# Patient Record
Sex: FEMALE | Race: WHITE | NOT HISPANIC OR LATINO | RURAL
[De-identification: names, ages, dates, MRNs, and addresses within clinical notes are randomized per-mention and may not be internally consistent; named-entity substitution may affect disease eponyms.]

---

## 2023-12-09 ENCOUNTER — HOSPITAL ENCOUNTER (EMERGENCY)
Facility: HOSPITAL | Age: 68
Discharge: HOME | End: 2023-12-09
Payer: MEDICARE

## 2023-12-09 VITALS
RESPIRATION RATE: 17 BRPM | SYSTOLIC BLOOD PRESSURE: 111 MMHG | BODY MASS INDEX: 20.96 KG/M2 | HEART RATE: 90 BPM | OXYGEN SATURATION: 98 % | DIASTOLIC BLOOD PRESSURE: 63 MMHG | TEMPERATURE: 99 F | HEIGHT: 61 IN | WEIGHT: 111 LBS

## 2023-12-09 DIAGNOSIS — L98.9 SKIN ABNORMALITIES: Primary | ICD-10-CM

## 2023-12-09 PROCEDURE — 99283 PR EMERGENCY DEPT VISIT,LEVEL III: ICD-10-PCS | Mod: ,,, | Performed by: REGISTERED NURSE

## 2023-12-09 PROCEDURE — 99283 EMERGENCY DEPT VISIT LOW MDM: CPT | Mod: ,,, | Performed by: REGISTERED NURSE

## 2023-12-09 PROCEDURE — 99281 EMR DPT VST MAYX REQ PHY/QHP: CPT

## 2023-12-09 RX ORDER — QUETIAPINE FUMARATE 25 MG/1
TABLET, FILM COATED ORAL
COMMUNITY

## 2023-12-09 RX ORDER — PROPRANOLOL HYDROCHLORIDE 20 MG/1
20 TABLET ORAL 2 TIMES DAILY
COMMUNITY

## 2023-12-09 RX ORDER — MESALAMINE 1000 MG/1
500 SUPPOSITORY RECTAL 2 TIMES DAILY
COMMUNITY

## 2023-12-09 RX ORDER — SULFAMETHOXAZOLE AND TRIMETHOPRIM 800; 160 MG/1; MG/1
1 TABLET ORAL 2 TIMES DAILY
COMMUNITY
End: 2024-01-25

## 2023-12-10 NOTE — ED PROVIDER NOTES
Encounter Date: 12/9/2023       History     Chief Complaint   Patient presents with    GI Problem     68-year-old female presents to the emergency room tonight due to complications with the stoma.  Patient recently had surgery and has been hospital for severe  ulcerative colitis and colostomy placed.  She reports leakage from stoma for several weeks and today home health has seen her at home and noticed underneath her stoma was discoloration.        Review of patient's allergies indicates:   Allergen Reactions    Codeine Other (See Comments)    Humira [adalimumab] Other (See Comments)    Mercaptopurine analogues (thiopurines) Other (See Comments)     Liver problems    Amoxicillin Rash    Ciprofloxacin Rash    Opioids - morphine analogues Rash    Propofol Rash     Rash, shaking, Hives     History reviewed. No pertinent past medical history.  Past Surgical History:   Procedure Laterality Date    HYSTERECTOMY       History reviewed. No pertinent family history.  Social History     Tobacco Use    Smoking status: Never    Smokeless tobacco: Never   Substance Use Topics    Alcohol use: Never    Drug use: Never     Review of Systems   Respiratory:  Negative for shortness of breath.    Gastrointestinal:  Negative for nausea and vomiting.   Skin:  Positive for wound.   All other systems reviewed and are negative.      Physical Exam     Initial Vitals [12/09/23 1559]   BP Pulse Resp Temp SpO2   111/63 90 17 99.2 °F (37.3 °C) 98 %      MAP       --         Physical Exam    Constitutional: She appears well-developed and well-nourished. She is not diaphoretic.   HENT:   Head: Normocephalic and atraumatic.   Eyes: EOM are normal.   Neck: Neck supple.   Normal range of motion.  Cardiovascular:  Normal rate and intact distal pulses.           No murmur heard.  Abdominal: Bowel sounds are normal. She exhibits no distension. There is no abdominal tenderness.   Minor skin breakdown with the skin underneath colostomy   Musculoskeletal:          General: Normal range of motion.      Cervical back: Normal range of motion and neck supple.     Neurological: She is alert and oriented to person, place, and time. No cranial nerve deficit or sensory deficit. GCS score is 15. GCS eye subscore is 4. GCS verbal subscore is 5. GCS motor subscore is 6.   Skin: Skin is warm and dry.   Psychiatric: She has a normal mood and affect.         Medical Screening Exam   See Full Note    ED Course   Procedures  Labs Reviewed - No data to display       Imaging Results    None          Medications - No data to display  Medical Decision Making                                    Clinical Impression:   Final diagnoses:  [L98.9] Skin abnormalities (Primary)        ED Disposition Condition    Discharge Stable          ED Prescriptions    None       Follow-up Information    None          Pelon Fischer ACNP  12/10/23 0910

## 2024-01-17 NOTE — CONSULTS
[facility] - Emergency Department  Ostomy Care    Patient Name:  Erica Becerra   MRN:  [id number]  Date: 1/17/2024  Diagnosis: <principal problem not specified>    History:     History reviewed. No pertinent past medical history.    Social History     Socioeconomic History    Marital status:    Tobacco Use    Smoking status: Never    Smokeless tobacco: Never   Substance and Sexual Activity    Alcohol use: Never    Drug use: Never    Sexual activity: Not Currently       Precautions:     Allergies as of 12/09/2023 - Reviewed 12/09/2023   Allergen Reaction Noted    Codeine Other (See Comments) 12/09/2023    Humira [adalimumab] Other (See Comments) 12/09/2023    Mercaptopurine analogues (thiopurines) Other (See Comments) 12/09/2023    Amoxicillin Rash 12/09/2023    Ciprofloxacin Rash 12/09/2023    Opioids - morphine analogues Rash 12/09/2023    Propofol Rash 12/09/2023

## 2024-01-24 ENCOUNTER — CLINICAL SUPPORT (OUTPATIENT)
Dept: WOUND CARE | Facility: CLINIC | Age: 69
End: 2024-01-24
Payer: MEDICARE

## 2024-01-24 DIAGNOSIS — L98.491 SKIN ULCERATION, LIMITED TO BREAKDOWN OF SKIN: Primary | ICD-10-CM

## 2024-01-24 PROCEDURE — 99213 OFFICE O/P EST LOW 20 MIN: CPT | Mod: PBBFAC

## 2024-01-24 NOTE — PATIENT INSTRUCTIONS
OSTOMY POUCH APPLICATION: 1 or 2 piece cut to fit  *Change pouch every 3-7 days and if leakage  1. Utilize adhesive remover spray and gentle technique when removing old pouch.  2. Cleanse peristomal skin with warm water only  - no soap, lotions, CHG wipes, etc.   3. Dry peristomal skin well.   4. Measure stoma and cut Brava Protective Sheet - the printed paper side has adhesive.  The plain side is applied up.   5. IMeasure stoma and cut barrier. MPORTANT: The adhesive in the barrier has to be activated! Warm the barrier surface between hands for 30-60 seconds PRIOR to applying to the patients skin.  6. Ensure the skin is dry then apply the PROTECTIVE SHEET FIRST. Then barrier over the stoma and apply hand for another 30-60 seconds to activate the adhesive.  7. Ensure all edges are conformed to skin.   *Do not add paste unless ordered by ostomy nurse - paste does not act as glue. It will actually prevent the new pouch from adhering well.    NOTIFY OSTOMY or HH NURSE:  1. Pouch doesn't adhere for at least 3 days.  2. ANY leaks wound.  3. ANY peristomal skin breakdown.

## 2024-01-24 NOTE — PROGRESS NOTES
[facility] - Wound Care  Ostomy Care    Patient Name:  Erica Becerra   MRN:  [id number]  Date: 1/24/2024  Diagnosis: [unfilled]    History:     No past medical history on file.    Social History     Socioeconomic History    Marital status:    Tobacco Use    Smoking status: Never    Smokeless tobacco: Never   Substance and Sexual Activity    Alcohol use: Never    Drug use: Never    Sexual activity: Not Currently       Precautions:     Allergies as of 01/24/2024 - Reviewed 12/09/2023   Allergen Reaction Noted    Codeine Other (See Comments) 12/09/2023    Humira [adalimumab] Other (See Comments) 12/09/2023    Mercaptopurine analogues (thiopurines) Other (See Comments) 12/09/2023    Amoxicillin Rash 12/09/2023    Ciprofloxacin Rash 12/09/2023    Opioids - morphine analogues Rash 12/09/2023    Propofol Rash 12/09/2023       WOC Assessment Details/Treatment        01/24/24 1612   WOCN Assessment   WOCN Total Time (mins) 90   Visit Date 01/24/24   Visit Time 1415   Consult Type New   WOCN Speciality Ostomy   WOCN List ileostomy   Wound Pyoderma Gangrenosum   Number of Wounds 3   Continence Type Fecal   Ostomy Type Ileostomy   Intervention chart review;assessed;changed;applied;coordination of care;consult other service;orders   Teaching on-going;complication   Skin Interventions   Skin Protection hydrocolloids used;pouching devices used        Altered Skin Integrity 01/24/24 1430 Right Lower quadrant Other (comment)   Date First Assessed/Time First Assessed: 01/24/24 1430   Altered Skin Integrity Present on Admission - Did Patient arrive to the hospital with altered skin?: yes  Side: Right  Location: Lower quadrant  Primary Wound Type: (c) Other (comment)   Wound Image    Drainage Amount Small   Drainage Characteristics/Odor Creamy;Serosanguineous   Appearance Red;Maroon;Purple;Bleeding   Tissue loss description Full thickness   Red (%), Wound Tissue Color 50 %   Periwound Area Intact;Dry;Redness    Wound Edges Irregular;Undefined   Wound Length (cm) 2.5 cm   Wound Width (cm) 7.4 cm   Wound Surface Area (cm^2) 18.5 cm^2   Care Cleansed with:;Antimicrobial agent;Wound cleanser;Other (see comments)  (followed by water)   Dressing Hydrocolloid  (Brava Protective Sheet)        Ileostomy 10/27/23 Standard (Brunilda, end) RLQ   Placement Date: 10/27/23   Inserted by: MD  Ileostomy Type: Standard (Brunilda, end)  Location: RLQ   Stoma Appearance round;rosebud appearance;red;protruding above skin level   Stoma Size (in) 1 in   Appliance 2-piece convex; intact;no leakage;changed;per patient request   Accessories/Skin Care cleansed w/ water;wafer barrier over peristomal skin;skin barrier ring   Treatment Bag change   Stoma Function stool   Peristomal Assessment Inflamed;Erythema;Painful;Ulcerated   Tolerance assisted w/ stoma care;independent w/ appliance change;independent w/ stoma care     CWOCN consulted for ileostomy site assessment, assistance with difficult pouching, leakage, painful skin ulcerations    Narrative: Pt presents with daughter and .  She has an intact ostomy pouch.  Hx of ulcerative colitis resulting in ileostomy surgery late October 2023 by Braydon Rivers MD.  Has begun to have painful ulcerations that compromise her pouching system and cause leakage, sometimes daily.  Rarely gets more than three days wear time.     Goals for Patient/Caregiver Education: Ability to use products that minimize moisture under pouching system, reduce inflammation and extend wear time.      Barriers to Learning: None noted.    Recommendations made to patient and family:      Dermatology Consult - Dx & Tx Peristomal Pyoderma Gangrenosum vs other inflammatory skin lesions.      Referral to Dr. James Francis - pt and family aware    Discontinue convex pouching - will have mfg samples sent to pt's home      Brava Protective Sheet - may leave in place x 7 days unless leakage under sheet in addition to pouch leakage.        Discharge Instructions Given.     Thank you for the consult.     I will continue to follow as patient requests.    01/24/2024

## 2024-03-07 ENCOUNTER — CLINICAL SUPPORT (OUTPATIENT)
Dept: VASCULAR SURGERY | Facility: CLINIC | Age: 69
End: 2024-03-07
Payer: MEDICARE

## 2024-03-07 VITALS
OXYGEN SATURATION: 99 % | TEMPERATURE: 99 F | DIASTOLIC BLOOD PRESSURE: 70 MMHG | HEART RATE: 64 BPM | SYSTOLIC BLOOD PRESSURE: 100 MMHG

## 2024-03-07 DIAGNOSIS — T14.8XXA WOUND DISCHARGE: Primary | ICD-10-CM

## 2024-03-07 PROCEDURE — 87070 CULTURE OTHR SPECIMN AEROBIC: CPT | Performed by: NURSE PRACTITIONER

## 2024-03-07 PROCEDURE — 99212 OFFICE O/P EST SF 10 MIN: CPT | Mod: PBBFAC | Performed by: NURSE PRACTITIONER

## 2024-03-07 PROCEDURE — 99214 OFFICE O/P EST MOD 30 MIN: CPT | Mod: S$PBB,,, | Performed by: NURSE PRACTITIONER

## 2024-03-07 RX ORDER — SULFAMETHOXAZOLE AND TRIMETHOPRIM 800; 160 MG/1; MG/1
1 TABLET ORAL 2 TIMES DAILY
Qty: 20 TABLET | Refills: 0 | Status: SHIPPED | OUTPATIENT
Start: 2024-03-07

## 2024-03-07 RX ORDER — MOMETASONE FUROATE 1 MG/G
CREAM TOPICAL DAILY
Qty: 15 G | Refills: 1 | Status: SHIPPED | OUTPATIENT
Start: 2024-03-07

## 2024-03-07 NOTE — PROGRESS NOTES
Subjective:       Patient ID: Erica Becerra is a 68 y.o. female.    Chief Complaint: No chief complaint on file.  History of ulcerative colitis requiring ostomy Follow-up with ostomy nurse tpday  Ostomy nurse reports purulent drainage obtained cultures progression of skin inflammation irritation  family history is not on file.  History reviewed. No pertinent past medical history.   Past Surgical History:   Procedure Laterality Date    HYSTERECTOMY         reports that she has never smoked. She has never used smokeless tobacco. She reports that she does not drink alcohol and does not use drugs.   HPI  Review of Systems   Integumentary:  Positive for wound.         Objective:      There were no vitals taken for this visit.   Physical Exam  Vitals and nursing note reviewed.   Constitutional:       Appearance: Normal appearance.   HENT:      Head: Normocephalic.      Mouth/Throat:      Mouth: Mucous membranes are moist.   Eyes:      Conjunctiva/sclera: Conjunctivae normal.   Cardiovascular:      Rate and Rhythm: Normal rate and regular rhythm.   Pulmonary:      Effort: Pulmonary effort is normal.   Abdominal:      Palpations: Abdomen is soft.      Comments: Ostomy in place reviewed images taken by ostomy nurse with inflammatory changes around stoma  Stoma is pink healthy and functioning   Skin:     General: Skin is warm and dry.   Neurological:      Mental Status: She is alert and oriented to person, place, and time.   Psychiatric:         Mood and Affect: Mood normal.           Assessment:       1. Wound discharge        Plan:     Wound cultures  bactrim   E scribe mometasone cream   Follow up next week

## 2024-03-07 NOTE — PROGRESS NOTES
[facility] - Vascular Surgery  Ostomy Care    Patient Name:  Erica Becerra   MRN:  [id number]  Date: 3/7/2024  Diagnosis: [unfilled]    History:     History reviewed. No pertinent past medical history.    Social History     Socioeconomic History    Marital status:    Tobacco Use    Smoking status: Never    Smokeless tobacco: Never   Substance and Sexual Activity    Alcohol use: Never    Drug use: Never    Sexual activity: Not Currently       Precautions:     Allergies as of 03/07/2024 - Reviewed 03/07/2024   Allergen Reaction Noted    Codeine Other (See Comments) 12/09/2023    Humira [adalimumab] Other (See Comments) 12/09/2023    Mercaptopurine analogues (thiopurines) Other (See Comments) 12/09/2023    Amoxicillin Rash 12/09/2023    Ciprofloxacin Rash 12/09/2023    Opioids - morphine analogues Rash 12/09/2023    Propofol Rash 12/09/2023       WOC Assessment Details/Treatment     Pt to clinic, today for worsening of júnior-stomal skin ulcerations.  Has been using mometasone furoate ointment that she was previously given by primary provider. Also covered ointment and wounds with Band-Aids before placing a Mepilex dressing (As recommended by [facility] CWOCN) and then finally pouch. Purulent drainage noted upon removal of pouch. Stool leakage noted @ 5 o'clock under Band-Aid.  Reports purulence started 2 days ago and pouch wear time is approx 1 - 1 1/2 days.         Wounds cultured. Recommend mometasone cream over ointment, recommend antibiotic.  Reported to AKASH Dick, who also saw patient at this nurse's request.      Re-pouched using mometasone ointment, Mepilex Up dressing over wounds cut to fit, Brava Protective Ring over Mepilex. Convex 2 piece used.        03/07/2024

## 2024-03-10 LAB
MICROORGANISM SPEC CULT: ABNORMAL
MICROORGANISM SPEC CULT: ABNORMAL

## 2024-03-21 ENCOUNTER — OFFICE VISIT (OUTPATIENT)
Dept: VASCULAR SURGERY | Facility: CLINIC | Age: 69
End: 2024-03-21
Payer: MEDICARE

## 2024-03-21 DIAGNOSIS — B37.9 CANDIDA ALBICANS INFECTION: Primary | ICD-10-CM

## 2024-03-21 PROCEDURE — 99213 OFFICE O/P EST LOW 20 MIN: CPT | Mod: PBBFAC | Performed by: NURSE PRACTITIONER

## 2024-03-21 PROCEDURE — 99214 OFFICE O/P EST MOD 30 MIN: CPT | Mod: S$PBB,,, | Performed by: NURSE PRACTITIONER

## 2024-03-21 RX ORDER — FLUCONAZOLE 150 MG/1
150 TABLET ORAL DAILY
Qty: 1 TABLET | Refills: 1 | Status: SHIPPED | OUTPATIENT
Start: 2024-03-21 | End: 2024-03-23

## 2024-03-21 NOTE — PROGRESS NOTES
Subjective:       Patient ID: Erica Becerra is a 68 y.o. female.    Chief Complaint: Wound Check  History of ulcerative colitis requiring ostomy Follow-up with ostomy nurse tpday  Ostomy nurse reports purulent drainage obtained cultures progression of skin inflammation irritation    03/21/2024 stoma check skin excoriation improving with steroid cream now has light anti fungal rash superior stoma patient reports that is itching no signs of infection  family history is not on file.  History reviewed. No pertinent past medical history.   Past Surgical History:   Procedure Laterality Date    HYSTERECTOMY         reports that she has never smoked. She has never used smokeless tobacco. She reports that she does not drink alcohol and does not use drugs.   Wound Check      Review of Systems   Integumentary:  Positive for rash and wound.        Client complain of itching around stone         Objective:      There were no vitals taken for this visit.   Physical Exam  Vitals and nursing note reviewed.   Constitutional:       Appearance: Normal appearance.   HENT:      Head: Normocephalic.      Mouth/Throat:      Mouth: Mucous membranes are moist.   Eyes:      Conjunctiva/sclera: Conjunctivae normal.   Cardiovascular:      Rate and Rhythm: Normal rate and regular rhythm.   Pulmonary:      Effort: Pulmonary effort is normal.   Abdominal:      General: Abdomen is flat.      Palpations: Abdomen is soft.      Comments: Stoma pink healthy functioning  Noninfected rash 12:00 p.m. 3 o'clock position around stoma consistent with fungal infection  No purulence no cellulitis     Skin:     General: Skin is warm and dry.   Neurological:      Mental Status: She is alert and oriented to person, place, and time.   Psychiatric:         Mood and Affect: Mood normal.           Assessment:       1. Candida albicans infection        Plan:       E seanibmarisol [facility] pharmacy patient will begin using her home antifungal powder continue steroid  cream ET nurse repack pouching stoma today

## 2024-04-11 ENCOUNTER — CLINICAL SUPPORT (OUTPATIENT)
Dept: VASCULAR SURGERY | Facility: CLINIC | Age: 69
End: 2024-04-11
Payer: MEDICARE

## 2024-04-11 DIAGNOSIS — T14.8XXA WOUND DISCHARGE: Primary | ICD-10-CM

## 2024-04-11 PROCEDURE — 99212 OFFICE O/P EST SF 10 MIN: CPT | Mod: PBBFAC | Performed by: NURSE PRACTITIONER

## 2024-04-12 NOTE — PROGRESS NOTES
[facility] - Vascular Surgery  Ostomy Care    Patient Name:  Erica Becerra   MRN:  [id number]  Date: 4/11/2024  Diagnosis: Peristomal Ulcerations    History:     No past medical history on file.    Social History     Socioeconomic History    Marital status:    Tobacco Use    Smoking status: Never    Smokeless tobacco: Never   Substance and Sexual Activity    Alcohol use: Never    Drug use: Never    Sexual activity: Not Currently       Precautions:     Allergies as of 04/11/2024 - Reviewed 04/11/2024   Allergen Reaction Noted    Codeine Other (See Comments) 12/09/2023    Humira [adalimumab] Other (See Comments) 12/09/2023    Mercaptopurine analogues (thiopurines) Other (See Comments) 12/09/2023    Amoxicillin Rash 12/09/2023    Ciprofloxacin Rash 12/09/2023    Opioids - morphine analogues Rash 12/09/2023    Propofol Rash 12/09/2023       WOC Assessment Details/Treatment        04/11/24 1949   WOCN Assessment   WOCN Total Time (mins) 75   Visit Date 04/11/24   Visit Time 1330   Consult Type Follow Up   WOCN Speciality Wound;Ostomy   WOCN List ileostomy   Wound Pyoderma Gangrenosum   Number of Wounds 1   Continence Type Fecal   Ostomy Type Ileostomy   Procedure ostomy pouch   Intervention chart review;assessed;changed;applied   Teaching on-going;complication   Skin Interventions   Device Skin Pressure Protection absorbent pad utilized/changed   Skin Protection adhesive use limited;hydrocolloids used;pouching devices used;incontinence pads utilized        Altered Skin Integrity 01/24/24 1430 Right medial Lower quadrant Other (comment)   Date First Assessed/Time First Assessed: 01/24/24 1430   Altered Skin Integrity Present on Admission - Did Patient arrive to the hospital with altered skin?: yes  Side: Right  Orientation: medial  Location: Lower quadrant  Primary Wound Type: (c) Othe...   Wound Image    Dressing Appearance Intact;Moist drainage   Drainage Amount Small   Drainage Characteristics/Odor  Serosanguineous;No odor   Appearance Red;Moist;Granulating   Tissue loss description Partial thickness   Red (%), Wound Tissue Color 100 %   Periwound Area Intact;Dry;Redness;Scar tissue   Wound Edges Defined;Irregular;Open   Wound Length (cm) 0.5 cm   Wound Width (cm) 2.2 cm   Wound Depth (cm) 0.1 cm   Wound Volume (cm^3) 0.11 cm^3   Wound Surface Area (cm^2) 1.1 cm^2   Care Cleansed with:;Other (see comments);Applied:  (water; mometasone cream)   Dressing Changed;Hydrocolloid   Periwound Care Dry periwound area maintained;Hydrocolloid barrier applied        Ileostomy 10/27/23 Standard (suma Worley) RLQ   Placement Date: 10/27/23   Present Prior to Hospital Arrival?: Yes  Inserted by: MD  Ileostomy Type: Standard (suma Worley)  Location: RLQ  Initial Stoma Size (in): 1.13 in   Stoma Appearance round;rosebud appearance;mottled   Stoma Size (in) 1 1/8   Appliance 2-piece;no leakage;per patient request   Accessories/Skin Care cleansed w/ water;corticosteroid cream;wafer barrier over peristomal skin   Treatment Bag change;Site care   Stoma Function stool;mushy   Peristomal Assessment Clean;Erythema;Induration;Inflamed;Irregular border;Painful;Ulcerated   Tolerance signs/symptoms of discomfort;assisted w/ appliance change;assisted w/ stoma care     CWOCN consulted for peristomal ulcerations - likely PG    Narrative: Pt presents today for f/u peristomal assessment.  Reports that she achieved 4 day wear time since last visit and was pleased to report that she was able to go shopping for the first time in a long time. However, also reports that she was in a hurry during last pouch change on Monday and didn't use adhesive release during pouch removal and feels some irritation.  Instructed on healing process of wounds post resurfacing and tensile strength of 80% when fully remodeled, inflammatory process of ulcerations and predisposition of area for further breakdown. Verbalized understanding.     Goals for  Patient/Caregiver: Pt will achieve 4+ day wear-time, will minimize future trauma with use of appropriate products at every pouch change.       Patient and/or caregiver will successfully perform return demonstration of skill necessary for the care and maintenance of a Ileostomy - ongoing    Barriers to Learning: None noted    Thank you for the consult.     I will continue to follow per patient request - plan 1 week f/u.     04/11/2024

## 2024-05-06 ENCOUNTER — DOCUMENT SCAN (OUTPATIENT)
Dept: HOME HEALTH SERVICES | Facility: HOSPITAL | Age: 69
End: 2024-05-06
Payer: MEDICARE

## 2024-05-14 DIAGNOSIS — M75.121 COMPLETE ROTATR-CUFF TEAR/RUPTR OF R SHOULDER, NOT TRAUMA: Primary | ICD-10-CM

## 2024-05-22 ENCOUNTER — HOSPITAL ENCOUNTER (OUTPATIENT)
Dept: RADIOLOGY | Facility: HOSPITAL | Age: 69
Discharge: HOME | End: 2024-05-22
Attending: ORTHOPAEDIC SURGERY
Payer: MEDICARE

## 2024-05-22 ENCOUNTER — OFFICE VISIT (OUTPATIENT)
Dept: ORTHOPEDICS | Facility: CLINIC | Age: 69
End: 2024-05-22
Payer: MEDICARE

## 2024-05-22 DIAGNOSIS — M75.121 NONTRAUMATIC COMPLETE TEAR OF RIGHT ROTATOR CUFF: Primary | ICD-10-CM

## 2024-05-22 DIAGNOSIS — Z01.812 PRE-OPERATIVE LABORATORY EXAMINATION: ICD-10-CM

## 2024-05-22 DIAGNOSIS — M75.121 COMPLETE ROTATR-CUFF TEAR/RUPTR OF R SHOULDER, NOT TRAUMA: ICD-10-CM

## 2024-05-22 DIAGNOSIS — Z01.810 PRE-OPERATIVE CARDIOVASCULAR EXAMINATION: ICD-10-CM

## 2024-05-22 DIAGNOSIS — M25.511 RIGHT SHOULDER PAIN, UNSPECIFIED CHRONICITY: ICD-10-CM

## 2024-05-22 DIAGNOSIS — M25.511 RIGHT SHOULDER PAIN, UNSPECIFIED CHRONICITY: Primary | ICD-10-CM

## 2024-05-22 PROCEDURE — 99213 OFFICE O/P EST LOW 20 MIN: CPT | Mod: PBBFAC,25 | Performed by: ORTHOPAEDIC SURGERY

## 2024-05-22 PROCEDURE — 73030 X-RAY EXAM OF SHOULDER: CPT | Mod: TC,RT

## 2024-05-22 PROCEDURE — 99204 OFFICE O/P NEW MOD 45 MIN: CPT | Mod: S$PBB,,, | Performed by: ORTHOPAEDIC SURGERY

## 2024-05-22 PROCEDURE — 73030 X-RAY EXAM OF SHOULDER: CPT | Mod: 26,RT,, | Performed by: ORTHOPAEDIC SURGERY

## 2024-05-22 NOTE — PROGRESS NOTES
Radiology Interpretation        Patient Name: Erica Becerra  Date: 5/22/2024  YOB: 1955  MRN# [id number]        ORDERING DIAGNOSIS:    Encounter Diagnosis   Name Primary?    Complete rotatr-cuff tear/ruptr of r shoulder, not trauma         Two views AP lateral right shoulder skeletally mature individual there is elevation of the humeral head there is some degenerative changes of the AC joint as well as mild degenerative changes glenohumeral joint no fractures noted impression degenerative changes AC joint right shoulder               Jonathan Cummings MD

## 2024-05-22 NOTE — PATIENT INSTRUCTIONS
Your surgery is scheduled at [facility] in [place] for 2024    Pre-Op Testin2024    ___x____ Lab (Clinic Lab 1st Floor)  _______ Chest X-ray ([facility] 1st Floor)  ___x____ EKG (Clinic 2nd Floor)    *Our office will contact you the day before surgery to give you  the arrival time.    *Do NOT eat or drink anything after midnight the night before your surgery.    *Bring all medications in their original bottles.    *Bring anything you may need for an overnight stay.     *Bathe with Hibiclens the night or morning before surgery.    *The morning of your surgery ONLY take blood pressure medications, heart medications, medications for acid reflux, and thyroid medications (the morning dose only).  *Take these medications with a sip of water.    *Do not take Insulin or Diabetic Medications the night before or the morning of your surgery, unless directed otherwise.    *Be sure that you have stopped blood thinners at the appropriate time, as instructed.  (_____ days prior to surgery)    *Bring your C-Pap machine if you have one.    *All jewelry and piercings MUST be removed prior to surgery.    *False eye lashes must be removed prior to surgery.    *Any questions regarding co-pays or deductibles with insurance, please contact the [facility] at #[phone].    *For Financial Assistance you may call #[phone] or #[phone].    Thank you for choosing Dr. Jonathan Cummings for your Orthopedic needs.  We look forward to caring for you. If you have any questions, please contact our office at [phone].

## 2024-05-22 NOTE — PROGRESS NOTES
Clinic Note        CC: No chief complaint on file.       Principal problem: Nontraumatic complete tear of right rotator cuff [M75.121]     REASON FOR VISIT:       HISTORY:  68-year-old female who is complaining of right shoulder pain she had surgery and after her surgery she noticed that she felt a pop in her arm and since that time been unable to bring it up overhead it has been a proximally 5 months.  She denies any numbness or tingling.      PAST MEDICAL HISTORY: History reviewed. No pertinent past medical history.       PAST SURGICAL HISTORY:   Past Surgical History:   Procedure Laterality Date    HYSTERECTOMY            ALLERGIES:   Review of patient's allergies indicates:   Allergen Reactions    Codeine Other (See Comments)    Humira [adalimumab] Other (See Comments)    Mercaptopurine analogues (thiopurines) Other (See Comments)     Liver problems    Amoxicillin Rash    Ciprofloxacin Rash    Opioids - morphine analogues Rash    Propofol Rash     Rash, shaking, Hives        MEDICATIONS :    Current Outpatient Medications:     mesalamine (CANASA) 1000 MG Supp, Place 500 mg rectally 2 (two) times daily., Disp: , Rfl:     mometasone 0.1% (ELOCON) 0.1 % cream, Apply topically once daily., Disp: 15 g, Rfl: 1    propranoloL (INDERAL) 20 MG tablet, Take 20 mg by mouth 2 (two) times daily., Disp: , Rfl:     QUEtiapine (SEROQUEL) 25 MG Tab, Take by mouth., Disp: , Rfl:     sulfamethoxazole-trimethoprim 800-160mg (BACTRIM DS) 800-160 mg Tab, Take 1 tablet by mouth 2 (two) times daily., Disp: 20 tablet, Rfl: 0     SOCIAL HISTORY:   Social History     Socioeconomic History    Marital status:    Tobacco Use    Smoking status: Never    Smokeless tobacco: Never   Substance and Sexual Activity    Alcohol use: Never    Drug use: Never    Sexual activity: Not Currently          FAMILY HISTORY: No family history on file.       PHYSICAL EXAM:  In general, this is a well-developed, well-nourished female . The  patient is alert, oriented and cooperative.      HEENT:  Normocephalic, atraumatic.  Extraocular movements are intact bilaterally.  The oropharynx is benign.       NECK:  Nontender with good range of motion.      LUNGS:  Clear to auscultation bilaterally.      HEART:  Demonstrates a regular rate and rhythm.  No murmurs appreciated.      ABDOMEN:  Soft, non-tender, non-distended.        EXTREMITIES:  Right upper extremity she moves her fingers has sensation to touch has palpable pulses distally she does have pain on palpation over the anterolateral acromion.  She has passive motion that is near full and equal with the opposite side.  Actively she can not forward flex or abduct past proximally 80°.  No instability noted.      RADIOGRAPHIC FINDINGS:  X-rays show some slight elevation of the humeral head on the right with degenerative changes AC joint MRI of the right shoulder shows complete supraspinatus and infraspinatus tear      IMPRESSION: Nontraumatic complete tear of right rotator cuff    Complete rotatr-cuff tear/ruptr of r shoulder, not trauma  -     Ambulatory referral/consult to Orthopedics         PLAN:  Patient has large rotator cuff tear.  At this time it is several months old.  We discussed treatment options.  We will set her up for rotator cuff repair of the right shoulder.  We will set this up in the near future.  Risks and benefits of arthroscopy and possible open repair were discussed patient wished to proceed with surgery.  There are no Patient Instructions on file for this visit.      No follow-ups on file.         Jonathan Cummings      (Subject to voice recognition error, transcription service not allowed)

## 2024-05-30 ENCOUNTER — CLINICAL SUPPORT (OUTPATIENT)
Dept: VASCULAR SURGERY | Facility: CLINIC | Age: 69
End: 2024-05-30
Payer: MEDICARE

## 2024-05-30 DIAGNOSIS — Z43.2 ILEOSTOMY CARE: Primary | ICD-10-CM

## 2024-05-30 PROCEDURE — 99214 OFFICE O/P EST MOD 30 MIN: CPT | Mod: S$PBB,,, | Performed by: NURSE PRACTITIONER

## 2024-05-30 PROCEDURE — 99212 OFFICE O/P EST SF 10 MIN: CPT | Mod: PBBFAC | Performed by: NURSE PRACTITIONER

## 2024-05-30 RX ORDER — PREDNISONE 10 MG/1
TABLET ORAL
Qty: 21 TABLET | Refills: 0 | Status: SHIPPED | OUTPATIENT
Start: 2024-05-30 | End: 2024-06-13

## 2024-05-30 NOTE — ADDENDUM NOTE
Addended by: WINTER COCHRAN on: 5/30/2024 11:42 AM     Modules accepted: Orders, Level of Service

## 2024-05-30 NOTE — PROGRESS NOTES
[facility] - Vascular Surgery  Ostomy Care    Patient Name:  Erica Becerra   MRN:  [id number]  Date: 5/30/2024  Diagnosis: [unfilled]    History:     History reviewed. No pertinent past medical history.    Social History     Socioeconomic History    Marital status:    Tobacco Use    Smoking status: Never    Smokeless tobacco: Never   Substance and Sexual Activity    Alcohol use: Never    Drug use: Never    Sexual activity: Not Currently       Precautions:     Allergies as of 05/30/2024 - Reviewed 05/30/2024   Allergen Reaction Noted    Codeine Other (See Comments) 12/09/2023    Humira [adalimumab] Other (See Comments) 12/09/2023    Mercaptopurine analogues (thiopurines) Other (See Comments) 12/09/2023    Amoxicillin Rash 12/09/2023    Ciprofloxacin Rash 12/09/2023    Opioids - morphine analogues Rash 12/09/2023    Propofol Rash 12/09/2023       WOC Assessment Details/Treatment        05/30/24 1434   WOCN Assessment   WOCN Total Time (mins) 90   Visit Date 05/30/24   Visit Time 1330   Consult Type Follow Up   WOCN Speciality Wound;Ostomy   WOCN List ileostomy   Wound Pyoderma Gangrenosum;surgical   Number of Wounds 2   Continence Type Fecal   Ostomy Type Ileostomy   Procedure ostomy pouch;silver nitrate   Intervention chart review;assessed;changed;applied;team conference   Skin Interventions   Skin Protection pouching devices used   Positioning   Body Position supine   Head of Bed (HOB) Positioning HOB at 20-30 degrees   Pressure Injury Prevention    Check Moisture Management Pad Done        Altered Skin Integrity 01/24/24 1430 Right medial;lateral Lower quadrant Other (comment)   Date First Assessed/Time First Assessed: 01/24/24 1430   Altered Skin Integrity Present on Admission - Did Patient arrive to the hospital with altered skin?: yes  Side: Right  Orientation: medial;lateral  Location: Lower quadrant  Is this injury devic...   Wound Image     Dressing Appearance Intact;Moist drainage    Drainage Amount Small   Drainage Characteristics/Odor Serosanguineous   Appearance Red;Moist;Granulating;Bleeding   Tissue loss description Full thickness   Red (%), Wound Tissue Color 100 %   Periwound Area Gray;Maroon;Redness;Scar tissue   Wound Edges Defined;Open   Wound Length (cm) 0.2 cm   Wound Width (cm) 0.8 cm   Wound Depth (cm) 0.1 cm   Wound Volume (cm^3) 0.016 cm^3   Wound Surface Area (cm^2) 0.16 cm^2   Care Applied:;Other (see comments)  (Tacrolimus 1% topical)   Dressing Non-adherent;Other (comment);Hydrocolloid  (Curad Pad)        Ileostomy 10/27/23 Standard (Brunilda, end) RLQ   Placement Date: 10/27/23   Present Prior to Hospital Arrival?: Yes  Inserted by: MD  Ileostomy Type: Standard (Brunilda, end)  Location: RLQ  Initial Stoma Size (in): 1.13 in  Additional Comments: Surgeon Dr. Silvestre MD   Wound Image     Stoma Appearance round;rosebud appearance;dusky;moist;protruding above skin level;other (see comments)  (granulomas)   Stoma Size (in) 29   Appliance 2-piece;intact;no leakage;changed;per patient request   Accessories/Skin Care appliance belt;skin barrier ring   Treatment Bag change;Site care   Stoma Function flatus;stool;mushy;brown   Peristomal Assessment Inflamed;Moist;Red;Ulcerated;Other (Comment)  (granulomas 6 o'clock)   Tolerance no signs/symptoms of discomfort;assisted w/ appliance change     CWOCN follow-up visit for peristomal ulcerations - PG    Narrative: Pt presents with family for continued treatment/assessment of 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 See additional note under Notes TAB for tentative f/u plan/dates.    05/30/2024

## 2024-05-30 NOTE — PROGRESS NOTES
Subjective:       Patient ID: Erica Becerra is a 68 y.o. female.    Chief Complaint: No chief complaint on file.  History of ulcerative colitis requiring ostomy Follow-up with ostomy nurse tpday  Ostomy nurse reports purulent drainage obtained cultures progression of skin inflammation irritation    03/21/2024 stoma check skin excoriation improving with steroid cream now has light anti fungal rash superior stoma patient reports that is itching no signs of infection    05/30/2024  wound check ileostomy with some leakage  stoma healthy, functioning,6:00 position small granuloma treated with silver nitrate, new areas of ulceration see images in epic                  family history is not on file.  History reviewed. No pertinent past medical history.   Past Surgical History:   Procedure Laterality Date    HYSTERECTOMY         reports that she has never smoked. She has never used smokeless tobacco. She reports that she does not drink alcohol and does not use drugs.   HPI  Review of Systems   Integumentary:  Positive for wound.         Objective:      There were no vitals taken for this visit.   Physical Exam  Vitals and nursing note reviewed.   Constitutional:       Appearance: Normal appearance.   HENT:      Head: Normocephalic.      Mouth/Throat:      Mouth: Mucous membranes are moist.   Eyes:      Conjunctiva/sclera: Conjunctivae normal.   Cardiovascular:      Rate and Rhythm: Normal rate and regular rhythm.   Pulmonary:      Effort: Pulmonary effort is normal.   Abdominal:      Palpations: Abdomen is soft.      Comments: ileostomy   Skin:     General: Skin is warm and dry.   Neurological:      Mental Status: She is alert and oriented to person, place, and time.   Psychiatric:         Mood and Affect: Mood normal.           Assessment:       1. Ileostomy care        Plan:       Low dose round of prednisone 20mg for 7 days then taper to 10 mg daily   Has appointment in September with dermatology, Ashley Dawson

## 2024-07-02 ENCOUNTER — CLINICAL SUPPORT (OUTPATIENT)
Dept: CARDIOLOGY | Facility: CLINIC | Age: 69
End: 2024-07-02
Payer: MEDICARE

## 2024-07-02 DIAGNOSIS — Z01.810 PRE-OPERATIVE CARDIOVASCULAR EXAMINATION: ICD-10-CM

## 2024-07-02 PROCEDURE — 99211 OFF/OP EST MAY X REQ PHY/QHP: CPT | Mod: PBBFAC,25

## 2024-07-02 PROCEDURE — 93010 ELECTROCARDIOGRAM REPORT: CPT | Mod: S$PBB,,, | Performed by: INTERNAL MEDICINE

## 2024-07-02 PROCEDURE — 99999 PR PBB SHADOW E&M-EST. PATIENT-LVL I: CPT | Mod: PBBFAC,,,

## 2024-07-02 PROCEDURE — 93005 ELECTROCARDIOGRAM TRACING: CPT | Mod: PBBFAC | Performed by: INTERNAL MEDICINE

## 2024-07-03 LAB
OHS QRS DURATION: 76 MS
OHS QTC CALCULATION: 418 MS

## 2024-07-15 NOTE — HPI
68-year-old female with a right shoulder rotator cuff tear with some AC arthritis needing arthroscopic evaluation debridement possible repair of the right shoulder possible open repair  Right upper extremity she moves her fingers has sensation to touch has palpable pulses tender to palpation over her anterolateral acromion as well as over AC joint.  Weakness on forward flexion abduction.  Pain on impingement testing pain on crossed adduction testing.  X-rays show degenerative changes AC joint right shoulder MRI shows rotator cuff tear with AC arthritis right shoulder  Impression rotator cuff tear and AC arthritis right shoulder  Plan arthroscopic evaluation debridement possible repair right shoulder possible open repair

## 2024-07-15 NOTE — SUBJECTIVE & OBJECTIVE
No past medical history on file.    Past Surgical History:   Procedure Laterality Date    HYSTERECTOMY      ILEOSTOMY Right        Review of patient's allergies indicates:   Allergen Reactions    Codeine Other (See Comments)    Humira [adalimumab] Other (See Comments)    Mercaptopurine analogues (thiopurines) Other (See Comments)     Liver problems    Amoxicillin Rash    Ciprofloxacin Rash    Opioids - morphine analogues Rash    Propofol Rash     Rash, shaking, Hives       No current facility-administered medications for this encounter.     Current Outpatient Medications   Medication Sig    mesalamine (CANASA) 1000 MG Supp Place 500 mg rectally 2 (two) times daily.    mometasone 0.1% (ELOCON) 0.1 % cream Apply topically once daily.    propranoloL (INDERAL) 20 MG tablet Take 20 mg by mouth 2 (two) times daily.    QUEtiapine (SEROQUEL) 25 MG Tab Take by mouth.    sulfamethoxazole-trimethoprim 800-160mg (BACTRIM DS) 800-160 mg Tab Take 1 tablet by mouth 2 (two) times daily.     Family History    None       Tobacco Use    Smoking status: Never    Smokeless tobacco: Never   Substance and Sexual Activity    Alcohol use: Never    Drug use: Never    Sexual activity: Not Currently     Review of Systems   Constitutional: Negative for decreased appetite.   HENT:  Negative for congestion and ear discharge.    Eyes:  Negative for blurred vision.   Cardiovascular:  Negative for chest pain and syncope.   Respiratory:  Negative for cough and wheezing.    Endocrine: Negative for cold intolerance and polyuria.   Hematologic/Lymphatic: Negative for adenopathy and bleeding problem.   Skin:  Negative for color change, nail changes and suspicious lesions.   Musculoskeletal:  Positive for joint pain. Negative for muscle cramps and myalgias.   Gastrointestinal:  Negative for bloating and abdominal pain.   Genitourinary:  Negative for frequency and hematuria.   Neurological:  Negative for brief paralysis, sensory change and weakness.    Psychiatric/Behavioral:  Negative for altered mental status.    Allergic/Immunologic: Negative for hives.     Objective:     Vital Signs (Most Recent):    Vital Signs (24h Range):  BP: ()/()   Arterial Line BP: ()/()            There is no height or weight on file to calculate BMI.    No intake or output data in the 24 hours ending 07/15/24 1345             Right Shoulder Exam     Tenderness   The patient is tender to palpation of the acromioclavicular joint and supraspinatus.    Other   Sensation: normal    Vascular Exam     Right Pulses      Radial:                    2+         Significant Labs: All pertinent labs within the past 24 hours have been reviewed.    Significant Imaging: I have reviewed all pertinent imaging results/findings.

## 2024-07-15 NOTE — H&P
[facility] - Orthopedic Periop Services  Orthopedics  H&P    Patient Name: Erica Becerra  MRN: [id number]  Admission Date: (Not on file)  Primary Care Provider: No, Primary Doctor    Patient information was obtained from patient and past medical records.     Subjective:     Principal Problem:<principal problem not specified>    Chief Complaint: No chief complaint on file.       HPI: 68-year-old female with a right shoulder rotator cuff tear with some AC arthritis needing arthroscopic evaluation debridement possible repair of the right shoulder possible open repair  Right upper extremity she moves her fingers has sensation to touch has palpable pulses tender to palpation over her anterolateral acromion as well as over AC joint.  Weakness on forward flexion abduction.  Pain on impingement testing pain on crossed adduction testing.  X-rays show degenerative changes AC joint right shoulder MRI shows rotator cuff tear with AC arthritis right shoulder  Impression rotator cuff tear and AC arthritis right shoulder  Plan arthroscopic evaluation debridement possible repair right shoulder possible open repair    No past medical history on file.    Past Surgical History:   Procedure Laterality Date    HYSTERECTOMY      ILEOSTOMY Right        Review of patient's allergies indicates:   Allergen Reactions    Codeine Other (See Comments)    Humira [adalimumab] Other (See Comments)    Mercaptopurine analogues (thiopurines) Other (See Comments)     Liver problems    Amoxicillin Rash    Ciprofloxacin Rash    Opioids - morphine analogues Rash    Propofol Rash     Rash, shaking, Hives       No current facility-administered medications for this encounter.     Current Outpatient Medications   Medication Sig    mesalamine (CANASA) 1000 MG Supp Place 500 mg rectally 2 (two) times daily.    mometasone 0.1% (ELOCON) 0.1 % cream Apply topically once daily.    propranoloL (INDERAL) 20 MG tablet Take 20 mg by mouth 2 (two) times daily.     QUEtiapine (SEROQUEL) 25 MG Tab Take by mouth.    sulfamethoxazole-trimethoprim 800-160mg (BACTRIM DS) 800-160 mg Tab Take 1 tablet by mouth 2 (two) times daily.     Family History    None       Tobacco Use    Smoking status: Never    Smokeless tobacco: Never   Substance and Sexual Activity    Alcohol use: Never    Drug use: Never    Sexual activity: Not Currently     Review of Systems   Constitutional: Negative for decreased appetite.   HENT:  Negative for congestion and ear discharge.    Eyes:  Negative for blurred vision.   Cardiovascular:  Negative for chest pain and syncope.   Respiratory:  Negative for cough and wheezing.    Endocrine: Negative for cold intolerance and polyuria.   Hematologic/Lymphatic: Negative for adenopathy and bleeding problem.   Skin:  Negative for color change, nail changes and suspicious lesions.   Musculoskeletal:  Positive for joint pain. Negative for muscle cramps and myalgias.   Gastrointestinal:  Negative for bloating and abdominal pain.   Genitourinary:  Negative for frequency and hematuria.   Neurological:  Negative for brief paralysis, sensory change and weakness.   Psychiatric/Behavioral:  Negative for altered mental status.    Allergic/Immunologic: Negative for hives.     Objective:     Vital Signs (Most Recent):    Vital Signs (24h Range):  BP: ()/()   Arterial Line BP: ()/()            There is no height or weight on file to calculate BMI.    No intake or output data in the 24 hours ending 07/15/24 1345             Right Shoulder Exam     Tenderness   The patient is tender to palpation of the acromioclavicular joint and supraspinatus.    Other   Sensation: normal    Vascular Exam     Right Pulses      Radial:                    2+         Significant Labs: All pertinent labs within the past 24 hours have been reviewed.    Significant Imaging: I have reviewed all pertinent imaging results/findings.  Assessment/Plan:     No notes have been filed under this hospital  service.  Service: Orthopedic Surgery      Jonathan Cummings MD  Orthopedics  [facility] - Orthopedic Periop Services

## 2024-07-16 ENCOUNTER — ANESTHESIA EVENT (OUTPATIENT)
Dept: SURGERY | Facility: HOSPITAL | Age: 69
End: 2024-07-16
Payer: MEDICARE

## 2024-07-16 ENCOUNTER — HOSPITAL ENCOUNTER (OUTPATIENT)
Facility: HOSPITAL | Age: 69
Discharge: HOME | End: 2024-07-16
Attending: ORTHOPAEDIC SURGERY | Admitting: ORTHOPAEDIC SURGERY
Payer: MEDICARE

## 2024-07-16 ENCOUNTER — ANESTHESIA (OUTPATIENT)
Dept: SURGERY | Facility: HOSPITAL | Age: 69
End: 2024-07-16
Payer: MEDICARE

## 2024-07-16 VITALS
HEART RATE: 73 BPM | SYSTOLIC BLOOD PRESSURE: 114 MMHG | TEMPERATURE: 98 F | RESPIRATION RATE: 16 BRPM | OXYGEN SATURATION: 98 % | WEIGHT: 110 LBS | BODY MASS INDEX: 20.78 KG/M2 | DIASTOLIC BLOOD PRESSURE: 57 MMHG

## 2024-07-16 DIAGNOSIS — M75.121 NONTRAUMATIC COMPLETE TEAR OF RIGHT ROTATOR CUFF: ICD-10-CM

## 2024-07-16 PROCEDURE — 25000003 PHARM REV CODE 250: Performed by: ORTHOPAEDIC SURGERY

## 2024-07-16 PROCEDURE — D9220A PRA ANESTHESIA: Mod: ANES,,, | Performed by: ANESTHESIOLOGY

## 2024-07-16 PROCEDURE — D9220A PRA ANESTHESIA: Mod: CRNA,,,

## 2024-07-16 PROCEDURE — 27000450 HC CEREBRAL OXIMETER PROBE: Performed by: ANESTHESIOLOGY

## 2024-07-16 PROCEDURE — 71000015 HC POSTOP RECOV 1ST HR: Performed by: ORTHOPAEDIC SURGERY

## 2024-07-16 PROCEDURE — 25000003 PHARM REV CODE 250

## 2024-07-16 PROCEDURE — 37000008 HC ANESTHESIA 1ST 15 MINUTES: Performed by: ORTHOPAEDIC SURGERY

## 2024-07-16 PROCEDURE — 27200750 HC INSULATED NEEDLE/ STIMUPLEX: Performed by: ANESTHESIOLOGY

## 2024-07-16 PROCEDURE — 63600175 PHARM REV CODE 636 W HCPCS: Mod: JZ,JG | Performed by: ORTHOPAEDIC SURGERY

## 2024-07-16 PROCEDURE — 27000510 HC BLANKET BAIR HUGGER ANY SIZE: Performed by: ANESTHESIOLOGY

## 2024-07-16 PROCEDURE — 27202344 HC EYESHIELD: Performed by: ANESTHESIOLOGY

## 2024-07-16 PROCEDURE — 27000655: Performed by: ANESTHESIOLOGY

## 2024-07-16 PROCEDURE — 71000033 HC RECOVERY, INTIAL HOUR: Performed by: ORTHOPAEDIC SURGERY

## 2024-07-16 PROCEDURE — 27000689 HC BLADE LARYNGOSCOPE ANY SIZE: Performed by: ANESTHESIOLOGY

## 2024-07-16 PROCEDURE — 36000710: Performed by: ORTHOPAEDIC SURGERY

## 2024-07-16 PROCEDURE — 97161 PT EVAL LOW COMPLEX 20 MIN: CPT

## 2024-07-16 PROCEDURE — 37000009 HC ANESTHESIA EA ADD 15 MINS: Performed by: ORTHOPAEDIC SURGERY

## 2024-07-16 PROCEDURE — 27201423 OPTIME MED/SURG SUP & DEVICES STERILE SUPPLY: Performed by: ORTHOPAEDIC SURGERY

## 2024-07-16 PROCEDURE — 64415 NJX AA&/STRD BRCH PLXS IMG: CPT | Mod: 59,RT,, | Performed by: ANESTHESIOLOGY

## 2024-07-16 PROCEDURE — 71000016 HC POSTOP RECOV ADDL HR: Performed by: ORTHOPAEDIC SURGERY

## 2024-07-16 PROCEDURE — 23410 REPAIR ROTATOR CUFF ACUTE: CPT | Mod: RT,,, | Performed by: ORTHOPAEDIC SURGERY

## 2024-07-16 PROCEDURE — 63600175 PHARM REV CODE 636 W HCPCS: Performed by: ANESTHESIOLOGY

## 2024-07-16 PROCEDURE — 36000711: Performed by: ORTHOPAEDIC SURGERY

## 2024-07-16 PROCEDURE — C1713 ANCHOR/SCREW BN/BN,TIS/BN: HCPCS | Performed by: ORTHOPAEDIC SURGERY

## 2024-07-16 PROCEDURE — 23120 CLAVICULECTOMY PARTIAL: CPT | Mod: 51,RT,, | Performed by: ORTHOPAEDIC SURGERY

## 2024-07-16 PROCEDURE — 27000165 HC TUBE, ETT CUFFED: Performed by: ANESTHESIOLOGY

## 2024-07-16 PROCEDURE — 63600175 PHARM REV CODE 636 W HCPCS

## 2024-07-16 PROCEDURE — 27000716 HC OXISENSOR PROBE, ANY SIZE: Performed by: ANESTHESIOLOGY

## 2024-07-16 DEVICE — SWIVELOCK, SP BC 4.75MM
Type: IMPLANTABLE DEVICE | Site: SHOULDER | Status: FUNCTIONAL
Brand: ARTHREX®

## 2024-07-16 DEVICE — CORKSCREW FT, BC, SUTURETAPE, 4.75MM
Type: IMPLANTABLE DEVICE | Site: SHOULDER | Status: FUNCTIONAL
Brand: ARTHREX®

## 2024-07-16 RX ORDER — LIDOCAINE HYDROCHLORIDE 20 MG/ML
INJECTION, SOLUTION EPIDURAL; INFILTRATION; INTRACAUDAL; PERINEURAL
Status: DISCONTINUED | OUTPATIENT
Start: 2024-07-16 | End: 2024-07-16

## 2024-07-16 RX ORDER — LIDOCAINE HYDROCHLORIDE 10 MG/ML
1 INJECTION, SOLUTION EPIDURAL; INFILTRATION; INTRACAUDAL; PERINEURAL ONCE
OUTPATIENT
Start: 2024-07-16 | End: 2024-07-16

## 2024-07-16 RX ORDER — ONDANSETRON HYDROCHLORIDE 2 MG/ML
4 INJECTION, SOLUTION INTRAVENOUS DAILY PRN
Status: DISCONTINUED | OUTPATIENT
Start: 2024-07-16 | End: 2024-07-16 | Stop reason: HOSPADM

## 2024-07-16 RX ORDER — ROCURONIUM BROMIDE 10 MG/ML
INJECTION, SOLUTION INTRAVENOUS
Status: DISCONTINUED | OUTPATIENT
Start: 2024-07-16 | End: 2024-07-16

## 2024-07-16 RX ORDER — SODIUM CHLORIDE 9 MG/ML
INJECTION, SOLUTION INTRAVENOUS CONTINUOUS
Status: DISCONTINUED | OUTPATIENT
Start: 2024-07-16 | End: 2024-07-16 | Stop reason: HOSPADM

## 2024-07-16 RX ORDER — SODIUM CHLORIDE, SODIUM LACTATE, POTASSIUM CHLORIDE, CALCIUM CHLORIDE 600; 310; 30; 20 MG/100ML; MG/100ML; MG/100ML; MG/100ML
INJECTION, SOLUTION INTRAVENOUS CONTINUOUS
OUTPATIENT
Start: 2024-07-16

## 2024-07-16 RX ORDER — DEXAMETHASONE SODIUM PHOSPHATE 4 MG/ML
INJECTION, SOLUTION INTRA-ARTICULAR; INTRALESIONAL; INTRAMUSCULAR; INTRAVENOUS; SOFT TISSUE
Status: DISCONTINUED | OUTPATIENT
Start: 2024-07-16 | End: 2024-07-16

## 2024-07-16 RX ORDER — GLYCOPYRROLATE 0.2 MG/ML
INJECTION INTRAMUSCULAR; INTRAVENOUS
Status: DISCONTINUED | OUTPATIENT
Start: 2024-07-16 | End: 2024-07-16

## 2024-07-16 RX ORDER — CLINDAMYCIN PHOSPHATE 900 MG/50ML
900 INJECTION, SOLUTION INTRAVENOUS
Status: COMPLETED | OUTPATIENT
Start: 2024-07-16 | End: 2024-07-16

## 2024-07-16 RX ORDER — SODIUM CHLORIDE, SODIUM LACTATE, POTASSIUM CHLORIDE, CALCIUM CHLORIDE 600; 310; 30; 20 MG/100ML; MG/100ML; MG/100ML; MG/100ML
125 INJECTION, SOLUTION INTRAVENOUS CONTINUOUS
Status: DISCONTINUED | OUTPATIENT
Start: 2024-07-16 | End: 2024-07-16 | Stop reason: HOSPADM

## 2024-07-16 RX ORDER — ETOMIDATE 2 MG/ML
INJECTION INTRAVENOUS
Status: DISCONTINUED | OUTPATIENT
Start: 2024-07-16 | End: 2024-07-16

## 2024-07-16 RX ORDER — EPINEPHRINE 1 MG/ML
INJECTION INTRAMUSCULAR; INTRAVENOUS; SUBCUTANEOUS
Status: DISCONTINUED | OUTPATIENT
Start: 2024-07-16 | End: 2024-07-16 | Stop reason: HOSPADM

## 2024-07-16 RX ORDER — KETOROLAC TROMETHAMINE 30 MG/ML
INJECTION, SOLUTION INTRAMUSCULAR; INTRAVENOUS
Status: DISCONTINUED | OUTPATIENT
Start: 2024-07-16 | End: 2024-07-16

## 2024-07-16 RX ORDER — ONDANSETRON HYDROCHLORIDE 2 MG/ML
INJECTION, SOLUTION INTRAVENOUS
Status: DISCONTINUED | OUTPATIENT
Start: 2024-07-16 | End: 2024-07-16

## 2024-07-16 RX ORDER — FENTANYL CITRATE 50 UG/ML
INJECTION, SOLUTION INTRAMUSCULAR; INTRAVENOUS
Status: DISCONTINUED | OUTPATIENT
Start: 2024-07-16 | End: 2024-07-16

## 2024-07-16 RX ORDER — HYDROMORPHONE HYDROCHLORIDE 2 MG/ML
0.5 INJECTION, SOLUTION INTRAMUSCULAR; INTRAVENOUS; SUBCUTANEOUS EVERY 5 MIN PRN
Status: DISCONTINUED | OUTPATIENT
Start: 2024-07-16 | End: 2024-07-16 | Stop reason: HOSPADM

## 2024-07-16 RX ORDER — ROPIVACAINE HYDROCHLORIDE 7.5 MG/ML
INJECTION, SOLUTION EPIDURAL; PERINEURAL
Status: COMPLETED | OUTPATIENT
Start: 2024-07-16 | End: 2024-07-16

## 2024-07-16 RX ORDER — BUPIVACAINE HYDROCHLORIDE 2.5 MG/ML
INJECTION, SOLUTION EPIDURAL; INFILTRATION; INTRACAUDAL
Status: DISCONTINUED | OUTPATIENT
Start: 2024-07-16 | End: 2024-07-16 | Stop reason: HOSPADM

## 2024-07-16 RX ORDER — PROMETHAZINE HYDROCHLORIDE 25 MG/1
25 TABLET ORAL EVERY 6 HOURS PRN
Status: DISCONTINUED | OUTPATIENT
Start: 2024-07-16 | End: 2024-07-16 | Stop reason: HOSPADM

## 2024-07-16 RX ORDER — PHENYLEPHRINE HYDROCHLORIDE 10 MG/ML
INJECTION INTRAVENOUS
Status: DISCONTINUED | OUTPATIENT
Start: 2024-07-16 | End: 2024-07-16

## 2024-07-16 RX ORDER — MEPERIDINE HYDROCHLORIDE 25 MG/ML
25 INJECTION INTRAMUSCULAR; INTRAVENOUS; SUBCUTANEOUS EVERY 10 MIN PRN
Status: DISCONTINUED | OUTPATIENT
Start: 2024-07-16 | End: 2024-07-16 | Stop reason: HOSPADM

## 2024-07-16 RX ORDER — ONDANSETRON 4 MG/1
8 TABLET, ORALLY DISINTEGRATING ORAL EVERY 8 HOURS PRN
Status: DISCONTINUED | OUTPATIENT
Start: 2024-07-16 | End: 2024-07-16 | Stop reason: HOSPADM

## 2024-07-16 RX ORDER — GLUCAGON 1 MG
1 KIT INJECTION
Status: DISCONTINUED | OUTPATIENT
Start: 2024-07-16 | End: 2024-07-16 | Stop reason: HOSPADM

## 2024-07-16 RX ORDER — DIPHENHYDRAMINE HYDROCHLORIDE 50 MG/ML
25 INJECTION INTRAMUSCULAR; INTRAVENOUS EVERY 6 HOURS PRN
Status: DISCONTINUED | OUTPATIENT
Start: 2024-07-16 | End: 2024-07-16 | Stop reason: HOSPADM

## 2024-07-16 RX ORDER — ACETAMINOPHEN 500 MG
1000 TABLET ORAL EVERY 6 HOURS PRN
Status: DISCONTINUED | OUTPATIENT
Start: 2024-07-16 | End: 2024-07-16 | Stop reason: HOSPADM

## 2024-07-16 RX ORDER — MIDAZOLAM HYDROCHLORIDE 1 MG/ML
INJECTION INTRAMUSCULAR; INTRAVENOUS
Status: DISCONTINUED | OUTPATIENT
Start: 2024-07-16 | End: 2024-07-16

## 2024-07-16 RX ORDER — TRAMADOL HYDROCHLORIDE 50 MG/1
50 TABLET ORAL EVERY 6 HOURS PRN
Qty: 28 TABLET | Refills: 0 | Status: SHIPPED | OUTPATIENT
Start: 2024-07-16

## 2024-07-16 RX ADMIN — ETOMIDATE 6 MG: 2 INJECTION INTRAVENOUS at 10:07

## 2024-07-16 RX ADMIN — FENTANYL CITRATE 100 MCG: 50 INJECTION INTRAMUSCULAR; INTRAVENOUS at 10:07

## 2024-07-16 RX ADMIN — PHENYLEPHRINE HYDROCHLORIDE 100 MCG: 10 INJECTION INTRAVENOUS at 10:07

## 2024-07-16 RX ADMIN — CLINDAMYCIN IN 5 PERCENT DEXTROSE 900 MG: 18 INJECTION, SOLUTION INTRAVENOUS at 10:07

## 2024-07-16 RX ADMIN — ROCURONIUM BROMIDE 50 MG: 10 INJECTION, SOLUTION INTRAVENOUS at 10:07

## 2024-07-16 RX ADMIN — ONDANSETRON 4 MG: 2 INJECTION INTRAMUSCULAR; INTRAVENOUS at 12:07

## 2024-07-16 RX ADMIN — PHENYLEPHRINE HYDROCHLORIDE 50 MCG: 10 INJECTION INTRAVENOUS at 12:07

## 2024-07-16 RX ADMIN — ROPIVACAINE HYDROCHLORIDE 30 ML: 7.5 INJECTION, SOLUTION EPIDURAL; PERINEURAL at 10:07

## 2024-07-16 RX ADMIN — ONDANSETRON 4 MG: 2 INJECTION INTRAMUSCULAR; INTRAVENOUS at 10:07

## 2024-07-16 RX ADMIN — PHENYLEPHRINE HYDROCHLORIDE 50 MCG: 10 INJECTION INTRAVENOUS at 11:07

## 2024-07-16 RX ADMIN — LIDOCAINE HYDROCHLORIDE 60 MG: 20 INJECTION, SOLUTION INTRAVENOUS at 10:07

## 2024-07-16 RX ADMIN — KETOROLAC TROMETHAMINE 30 MG: 30 INJECTION, SOLUTION INTRAMUSCULAR at 10:07

## 2024-07-16 RX ADMIN — GLYCOPYRROLATE 0.2 MG: 0.2 INJECTION INTRAMUSCULAR; INTRAVENOUS at 10:07

## 2024-07-16 RX ADMIN — SODIUM CHLORIDE: 9 INJECTION, SOLUTION INTRAVENOUS at 07:07

## 2024-07-16 RX ADMIN — SUGAMMADEX 100 MG: 100 INJECTION, SOLUTION INTRAVENOUS at 12:07

## 2024-07-16 RX ADMIN — SODIUM CHLORIDE: 9 INJECTION, SOLUTION INTRAVENOUS at 11:07

## 2024-07-16 RX ADMIN — DEXAMETHASONE SODIUM PHOSPHATE 8 MG: 4 INJECTION, SOLUTION INTRA-ARTICULAR; INTRALESIONAL; INTRAMUSCULAR; INTRAVENOUS; SOFT TISSUE at 10:07

## 2024-07-16 RX ADMIN — MIDAZOLAM HYDROCHLORIDE 2 MG: 1 INJECTION, SOLUTION INTRAMUSCULAR; INTRAVENOUS at 10:07

## 2024-07-16 NOTE — ANESTHESIA PREPROCEDURE EVALUATION
07/16/2024  Erica Becerra is a 68 y.o., female.      Pre-op Assessment    I have reviewed the Patient Summary Reports.     I have reviewed the Nursing Notes. I have reviewed the NPO Status.   I have reviewed the Medications.     Review of Systems  Anesthesia Hx:    Propofol, morphine, amoxicillin allergy              Social:  Non-Smoker, No Alcohol Use       Hematology/Oncology:  Hematology Normal   Oncology Normal                                   EENT/Dental:  EENT/Dental Normal           Cardiovascular:  Cardiovascular Normal                                            Pulmonary:  Pulmonary Normal                       Renal/:  Renal/ Normal                 Hepatic/GI:  Hepatic/GI Normal                 Musculoskeletal:  Musculoskeletal Normal                Neurological:  Neurology Normal                                      Endocrine:  Endocrine Normal            Dermatological:  Skin Normal    Psych:  Psychiatric Normal                    Physical Exam  General: Well nourished    Airway:  Mallampati: II / II  Mouth Opening: Normal  TM Distance: > 6 cm  Tongue: Normal  Neck ROM: Normal ROM    Chest/Lungs:  Clear to auscultation, Normal Respiratory Rate    Heart:  Rate: Normal  Rhythm: Regular Rhythm        Anesthesia Plan  Type of Anesthesia, risks & benefits discussed:    Anesthesia Type: Gen ETT, Regional  Intra-op Monitoring Plan: Standard ASA Monitors  Post Op Pain Control Plan: multimodal analgesia  Induction:  IV  Informed Consent: Informed consent signed with the Patient and all parties understand the risks and agree with anesthesia plan.  All questions answered. Patient consented to blood products? Yes  ASA Score: 2  Day of Surgery Review of History & Physical: H&P Update referred to the surgeon/provider.I have interviewed and examined the patient. I have reviewed the patient's H&P dated:      Ready For Surgery From Anesthesia Perspective.     .

## 2024-07-16 NOTE — ANESTHESIA PROCEDURE NOTES
Intubation    Date/Time: 7/16/2024 10:18 AM    Performed by: William Anguiano II, CRNA  Authorized by: Julian Dejesus MD    Intubation:     Induction:  Intravenous    Intubated:  Postinduction    Mask Ventilation:  Easy mask    Attempts:  1    Attempted By:  CRNA    Method of Intubation:  Direct    Blade:  Irma 3    Laryngeal View Grade: Grade I - full view of cords      Difficult Airway Encountered?: No      Complications:  None    Airway Device:  Oral endotracheal tube    Airway Device Size:  7.0    Style/Cuff Inflation:  Cuffed    Inflation Amount (mL):  7    Tube secured:  21    Secured at:  The lips    Placement Verified By:  Capnometry    Complicating Factors:  None    Findings Post-Intubation:  BS equal bilateral and atraumatic/condition of teeth unchanged

## 2024-07-16 NOTE — ANESTHESIA PROCEDURE NOTES
Peripheral Block    Patient location during procedure: OR   Block not for primary anesthetic.  Reason for block: at surgeon's request and post-op pain management   Post-op Pain Location: rt shoulder pain, p op   Start time: 7/16/2024 10:14 AM  Timeout: 7/16/2024 10:13 AM   End time: 7/16/2024 10:17 AM    Staffing  Authorizing Provider: Julian Dejesus MD  Performing Provider: Julian Dejesus MD    Staffing  Performed by: Julian Dejesus MD  Authorized by: Julian Dejesus MD    Preanesthetic Checklist  Completed: patient identified, IV checked, site marked, risks and benefits discussed, surgical consent, monitors and equipment checked, pre-op evaluation and timeout performed  Peripheral Block  Patient position: sitting  Prep: ChloraPrep  Patient monitoring: heart rate, continuous pulse ox, continuous capnometry, frequent blood pressure checks and cardiac monitor  Block type: interscalene  Laterality: right  Injection technique: single shot  Needle  Needle type: Stimuplex   Needle gauge: 20 G  Needle length: 2 in  Needle localization: nerve stimulator and ultrasound guidance   -ultrasound image captured on disc.  Assessment  Injection assessment: negative aspiration, negative parasthesia and local visualized surrounding nerve  Paresthesia pain: none  Heart rate change: no  Slow fractionated injection: yes  Pain Tolerance: comfortable throughout block  Medications:    Medications: ROPIvacaine (NAROPIN) injection 0.75% - Perineural   30 mL - 7/16/2024 10:13:00 AM

## 2024-07-16 NOTE — TRANSFER OF CARE
Anesthesia Transfer of Care Note    Patient: Erica Becerra    Procedure(s) Performed: Procedure(s) (LRB):  ARTHROSCOPY, SHOULDER (Right)  DECOMPRESSION, SUBACROMIAL SPACE  REPAIR, ROTATOR CUFF (Right)  EXCISION, CLAVICLE, DISTAL (Right)  DEBRIDEMENT, SHOULDER, ARTHROSCOPIC (Right)    Patient location: PACU    Anesthesia Type: general and regional    Transport from OR: Transported from OR on 6-10 L/min O2 by face mask with adequate spontaneous ventilation    Post pain: adequate analgesia    Post assessment: no apparent anesthetic complications    Post vital signs: stable    Level of consciousness: responds to stimulation    Nausea/Vomiting: no nausea/vomiting    Complications: none    Transfer of care protocol was followed      Last vitals: Visit Vitals  BP (!) 106/52 (BP Location: Left arm, Patient Position: Lying)   Pulse 70   Temp 36.6 °C (97.8 °F) (Oral)   Resp 13   Wt 49.9 kg (110 lb)   SpO2 100%   Breastfeeding No   BMI 20.78 kg/m²

## 2024-07-16 NOTE — INTERVAL H&P NOTE
The patient has been examined and the H&P has been reviewed:    I concur with the findings and no changes have occurred since H&P was written.    Surgery risks, benefits and alternative options discussed and understood by patient/family.          Active Hospital Problems    Diagnosis  POA    *Nontraumatic complete tear of right rotator cuff [M75.121]  Yes      Resolved Hospital Problems   No resolved problems to display.

## 2024-07-16 NOTE — BRIEF OP NOTE
[facility] - Orthopedic Periop Services  Brief Operative Note    Surgery Date: 7/16/2024     Surgeons and Role:     * Jonathan Cummings MD - Primary    Assisting Surgeon: None    Pre-op Diagnosis:  Complete rotatr-cuff tear/ruptr of r shoulder, not trauma [M75.121]  Nontraumatic complete tear of right rotator cuff [M75.121]    Post-op Diagnosis:  Post-Op Diagnosis Codes:     * Complete rotatr-cuff tear/ruptr of r shoulder, not trauma [M75.121]     * Nontraumatic complete tear of right rotator cuff [M75.121]    Procedure(s) (LRB):  ARTHROSCOPY, SHOULDER (Right)  DECOMPRESSION, SUBACROMIAL SPACE  REPAIR, ROTATOR CUFF (Right)  EXCISION, CLAVICLE, DISTAL (Right)  DEBRIDEMENT, SHOULDER, ARTHROSCOPIC (Right)    Anesthesia: General/Regional    Operative Findings:  Patient underwent a right shoulder rotator cuff repair without complication.    Estimated Blood Loss: 25 mL         Specimens:   Specimen (24h ago, onward)      None              Discharge Note    OUTCOME: Patient tolerated treatment/procedure well without complication and is now ready for discharge.    DISPOSITION: Home or Self Care    FINAL DIAGNOSIS:  Nontraumatic complete tear of right rotator cuff    FOLLOWUP: In clinic    DISCHARGE INSTRUCTIONS:    Discharge Procedure Orders   Diet general     Keep surgical extremity elevated     Ice to affected area   Order Comments: using barrier between ice and skin (specify duration&frequency)     Remove dressing in 72 hours   Order Comments: Keep dressing in place for 72 hours     Change dressing (specify)   Order Comments: Dressing change: one time per day beginning 72 hours post op.     Call MD for:  temperature >100.4     Call MD for:  persistent nausea and vomiting     Call MD for:  severe uncontrolled pain     Call MD for:  difficulty breathing, headache or visual disturbances     Call MD for:  redness, tenderness, or signs of infection (pain, swelling, redness, odor or green/yellow discharge around incision  site)     Call MD for:  hives     Call MD for:  persistent dizziness or light-headedness     Call MD for:  extreme fatigue     Activity as tolerated     Shower on day dressing removed (No bath)     Weight bearing as tolerated        Clinical Reference Documents Added to Patient Instructions         Document    SHOULDER ARTHROSCOPY DISCHARGE INSTRUCTIONS (ENGLISH)

## 2024-07-16 NOTE — PT/OT/SLP EVAL
Physical Therapy Evaluation    Patient Name:  Erica Becerra   MRN:  [id number]    Recommendations:     Discharge Recommendations: Low Intensity Therapy   Discharge Equipment Recommendations: none   Barriers to discharge: None    Assessment:     Erica Becerra is a 68 y.o. female admitted with a medical diagnosis of Nontraumatic complete tear of right rotator cuff.  She presents with the following impairments/functional limitations: decreased ROM, orthopedic precautions Patient with good understanding of post op education. Patient to use sling due to illiostomy bad. Plan is dc home and follow up with physcian.    Rehab Prognosis: Good; patient would benefit from acute skilled PT services to address these deficits and reach maximum level of function.    Recent Surgery: Procedure(s) (LRB):  ARTHROSCOPY, SHOULDER (Right)  DECOMPRESSION, SUBACROMIAL SPACE  REPAIR, ROTATOR CUFF (Right)  EXCISION, CLAVICLE, DISTAL (Right)  DEBRIDEMENT, SHOULDER, ARTHROSCOPIC (Right) Day of Surgery    Plan:     During this hospitalization, patient to be seen 1 x/week to address the identified rehab impairments via therapeutic activities and progress toward the following goals:    Plan of Care Expires:  07/16/24    Subjective     Chief Complaint: right ue block  Patient/Family Comments/goals: Patient states she couldn't use abd pillow due to illiostomy bag.   Pain/Comfort:  Pain Rating 1: 0/10    Patients cultural, spiritual, Sikh conflicts given the current situation: no    Living Environment:  Lives with spouse  Prior to admission, patients level of function was independent.  Equipment used at home: none.  DME owned (not currently used): none.  Upon discharge, patient will have assistance from spouse and son.    Objective:     Communicated with nurse prior to session.  Patient found ambulatory in room/quiroz with    upon PT entry to room.    General Precautions: Standard, fall  Orthopedic Precautions:RUE non weight bearing   Braces: UE  Sling  Respiratory Status: Room air    Exams:  na    Functional Mobility:  Transfers:     Sit to Stand:  independence with no AD  Gait: ambulated to bathroom independent      AM-PAC 6 CLICK MOBILITY  Total Score:24       Treatment & Education:  HEP: hand and elbow rom only  Don/doff shirt and sling    Patient left sitting edge of bed with   and son present.    GOALS:   Multidisciplinary Problems       Physical Therapy Goals       Not on file                    History:     Past Medical History:   Diagnosis Date    Nonrheumatic mitral (valve) insufficiency        Past Surgical History:   Procedure Laterality Date    CAROTID ENDARTERECTOMY       SECTION      COLECTOMY      HYSTERECTOMY      ILEOSTOMY Right     TONSILLECTOMY         Time Tracking:     PT Received On: 24  PT Start Time: 1510     PT Stop Time: 1525  PT Total Time (min): 15 min     Billable Minutes: Evaluation 15      2024

## 2024-07-16 NOTE — OP NOTE
[facility] - Orthopedic Periop Services  General Surgery  Operative Note    SUMMARY     Date of Procedure: 7/16/2024     Procedure: Procedure(s) (LRB):  ARTHROSCOPY, SHOULDER (Right)  DECOMPRESSION, SUBACROMIAL SPACE  REPAIR, ROTATOR CUFF (Right)  EXCISION, CLAVICLE, DISTAL (Right)  DEBRIDEMENT, SHOULDER, ARTHROSCOPIC (Right)       Surgeons and Role:     * Jonathan Cummings MD - Primary    Assisting Surgeon: None    Pre-Operative Diagnosis: Complete rotatr-cuff tear/ruptr of r shoulder, not trauma [M75.121]  Nontraumatic complete tear of right rotator cuff [M75.121]    Post-Operative Diagnosis: Post-Op Diagnosis Codes:     * Complete rotatr-cuff tear/ruptr of r shoulder, not trauma [M75.121]     * Nontraumatic complete tear of right rotator cuff [M75.121]    Anesthesia: General/Regional    Operative Findings (including complications, if any):  After having risks and benefits of procedure explained at length the patient stating she understands risks and benefits wished to proceed with the procedure written informed consent was obtained patient was taken operating room placed in supine position on operative table anesthesia induced per Anesthesia.  At this time she was positioned beach chair position and right upper extremity prepped draped sterile fashion.  Portals for arthroscopy were marked at the anterior posterior lateral positions on the shoulder.  Posterior portal made with a stab incision with 11. Blade 1 cm inferior 1 cm medial to the posterolateral corner the acromion.  Blunt trocar used to introduce the cannula into the shoulder.  Camera introduced in shoulder inflated with saline.  Shoulder inspected the biceps tendon was intact.  The supraspinatus tendon had torn and retracted anteriorly from the interval.  The infraspinatus tendon had torn and retracted posteriorly.  There was some grade 1 chondromalacia small area of grade 2 of the glenohumeral joint.  No loose bodies noted.  Large amount of  synovitis noted.  There was fraying of the superior labrum.  Anterior portal made probe placed in rotator cuff tear confirmed anterior-inferior labrum intact biceps anchor was intact there was type 1 slap tear noted of the labrum.  Subscapularis tendon was intact supraspinatus and infraspinatus tendons were torn.  At this time shaver placed in synovitis was debrided in his well as debriding the superior labral tear.  Shoulder inspected no further intra-articular pathology at this time the anterior portal was extended posteriorly in an open repair of the rotator cuff was performed as well as an open subacromial decompression distal clavicle resection resecting the distal 8 mm of the clavicle with a rongeur after the capsule was opened in the distal clavicle exposed large osteophyte noted this was removed and the into the bone was then rasped and smoothed.  Once this was done the subacromial decompression was performed using rongeur to remove the anterior lateral acromial spur of the undersurface was then smoothed with a rasp.  Once this was done rotator interval was closed with a convergence stitch of 2. FiberWire.  Two bio corkscrew anchors from [employer] were used with FiberTape to repair of the rotator cuff in a double row technique using lateral SwiveLock anchors.  Once the rotator cuff was repaired shoulder inspected no further pathology.  Instruments fluid removed wound was irrigated out copious amounts normal saline the deltoid was reattached the anterior lateral acromion using a 2. FiberWire.  CA ligament was repaired through bone tunnel to the acromion.  Deltoid fascia and a capsule over the AC joint closed using 2. FiberWire.  Subcuticular 2-0 Vicryl followed by running 3-0 nylon skin posterior portal closed with interrupted nylon.  Sterile occlusive dressing placed.  Patient was then awakened taken recovery room in good condition.  All counts correct.  No complications.    Description of Technical  Procedures:     Significant Surgical Tasks Conducted by the Assistant(s), if Applicable:     Estimated Blood Loss (EBL): 25 mL           Implants:   Implant Name Type Inv. Item Serial No.  Lot No. LRB No. Used Action   ANCHOR CORKSCREW FT [id number] - [id number]  ANCHOR CORKSCREW FT [id number]  [employer] [id number] Right 1 Implanted   ANCHOR CORKSCREW FT [id number] - [id number]  ANCHOR CORKSCREW FT [id number]  [employer] [id number] Right 1 Implanted   4.75mm BIOCOMPOSITE SWIVELOCK ANCHOR, SELF PUNCHING WITH WHITE/BLUE 1.3MM SUTURETAPE    [employer] [id number] Right 2 Implanted       Specimens:   Specimen (24h ago, onward)      None                    Condition: Good    Disposition: PACU - hemodynamically stable.    Attestation: I was present and scrubbed for the entire procedure.

## 2024-07-16 NOTE — OR NURSING
1245 PT REC'D TO PACU. VSS. SATS 100% ON 6L SIMPLE FACE MASK. WILL TITRATE ATOL. DENIES PAIN AT THIS TIME. DRSG TO R SHOULDER C/D/I. WILL CONT TO MONITOR.    1256 UPDATE GIVEN TO SON VIA TEXT MESSAGE.     1300 R RADIAL PULSE +2. PT UNABLE TO WIGGLE FINGERS. NUMBNESS PRESENT. DENIES TINGLING.    1320 TRANSFERRED TO ASC IN STABLE CONDITION. REPORT GIVEN TO CLINTON LONGORIA. /64 HR 76 O2 % ON RA. SURGICAL SITES ADDRESSED. FAMILY AT BEDSIDE.

## 2024-07-16 NOTE — ANESTHESIA POSTPROCEDURE EVALUATION
Anesthesia Post Evaluation    Patient: Erica Becerra    Procedure(s) Performed: Procedure(s) (LRB):  ARTHROSCOPY, SHOULDER (Right)  DECOMPRESSION, SUBACROMIAL SPACE  REPAIR, ROTATOR CUFF (Right)  EXCISION, CLAVICLE, DISTAL (Right)  DEBRIDEMENT, SHOULDER, ARTHROSCOPIC (Right)    Final Anesthesia Type: general      Patient location during evaluation: PACU  Patient participation: Yes- Able to Participate  Level of consciousness: awake and sedated  Post-procedure vital signs: reviewed and stable  Pain management: adequate  Airway patency: patent    PONV status at discharge: No PONV  Anesthetic complications: no      Cardiovascular status: blood pressure returned to baseline  Respiratory status: unassisted  Hydration status: euvolemic  Follow-up not needed.              Vitals Value Taken Time   /63 07/16/24 1346   Temp 36.6 °C (97.8 °F) 07/16/24 1247   Pulse 70 07/16/24 1349   Resp 16 07/16/24 1330   SpO2 96 % 07/16/24 1349   Vitals shown include unfiled device data.      Event Time   Out of Recovery 13:15:00         Pain/Sanjeev Score: Sanjeev Score: 10 (7/16/2024  1:30 PM)

## 2024-07-29 ENCOUNTER — OFFICE VISIT (OUTPATIENT)
Dept: ORTHOPEDICS | Facility: CLINIC | Age: 69
End: 2024-07-29
Payer: MEDICARE

## 2024-07-29 DIAGNOSIS — Z47.89 ENCOUNTER FOR ORTHOPEDIC FOLLOW-UP CARE: ICD-10-CM

## 2024-07-29 DIAGNOSIS — M25.511 RIGHT SHOULDER PAIN, UNSPECIFIED CHRONICITY: Primary | ICD-10-CM

## 2024-07-29 PROCEDURE — 99999 PR PBB SHADOW E&M-EST. PATIENT-LVL III: CPT | Mod: PBBFAC,,, | Performed by: ORTHOPAEDIC SURGERY

## 2024-07-29 PROCEDURE — 99213 OFFICE O/P EST LOW 20 MIN: CPT | Mod: PBBFAC | Performed by: ORTHOPAEDIC SURGERY

## 2024-07-29 PROCEDURE — 99024 POSTOP FOLLOW-UP VISIT: CPT | Mod: ,,, | Performed by: ORTHOPAEDIC SURGERY

## 2024-07-29 NOTE — PROGRESS NOTES
Patient is here for follow-up of the right shoulder arthroscopy with open repair of the rotator cuff.  At this time she was neurovascularly intact distally.  Her wounds are clean and dry.  We will start passive motion for 4 weeks with therapy in [place] then we will start active range motion strengthening.  Neurovascularly she is intact distally.  I will follow up 6 weeks.  Wounds are clean and dry.  Stitches removed

## 2024-09-11 ENCOUNTER — OFFICE VISIT (OUTPATIENT)
Dept: ORTHOPEDICS | Facility: CLINIC | Age: 69
End: 2024-09-11
Payer: MEDICARE

## 2024-09-11 DIAGNOSIS — Z47.89 ENCOUNTER FOR ORTHOPEDIC FOLLOW-UP CARE: Primary | ICD-10-CM

## 2024-09-11 PROCEDURE — 99999 PR PBB SHADOW E&M-EST. PATIENT-LVL III: CPT | Mod: PBBFAC,,, | Performed by: ORTHOPAEDIC SURGERY

## 2024-09-11 PROCEDURE — 99213 OFFICE O/P EST LOW 20 MIN: CPT | Mod: PBBFAC | Performed by: ORTHOPAEDIC SURGERY

## 2024-09-11 PROCEDURE — 99024 POSTOP FOLLOW-UP VISIT: CPT | Mod: ,,, | Performed by: ORTHOPAEDIC SURGERY

## 2024-09-11 NOTE — PROGRESS NOTES
Patient is here for follow-up of the right shoulder open rotator cuff repair.  At this time she has little stiff on forward flexion abduction but her therapist can get her overhead.  I am getting her to a proximally 100° of forward flexion 80° of abduction before she has some soreness.  Her pain in his much better after the surgery.  We are going to work on range motion strengthening.  Neurovascularly she is intact distally.  Wounds well healed.  We will continue with therapy.  I will follow back up in a month.  If she continues to stay stiff we may have to consider a manipulation boot her therapist was able to bring her arm overhead when she was laying down.  We will continue to work on strengthening.  Follow back up in 4 weeks.

## 2024-10-09 ENCOUNTER — OFFICE VISIT (OUTPATIENT)
Dept: ORTHOPEDICS | Facility: CLINIC | Age: 69
End: 2024-10-09
Payer: MEDICARE

## 2024-10-09 VITALS
DIASTOLIC BLOOD PRESSURE: 61 MMHG | BODY MASS INDEX: 21.34 KG/M2 | OXYGEN SATURATION: 99 % | WEIGHT: 113 LBS | SYSTOLIC BLOOD PRESSURE: 124 MMHG | HEART RATE: 62 BPM | HEIGHT: 61 IN | TEMPERATURE: 98 F

## 2024-10-09 DIAGNOSIS — Z47.89 ENCOUNTER FOR ORTHOPEDIC FOLLOW-UP CARE: Primary | ICD-10-CM

## 2024-10-09 PROCEDURE — 99214 OFFICE O/P EST MOD 30 MIN: CPT | Mod: PBBFAC | Performed by: ORTHOPAEDIC SURGERY

## 2024-10-09 PROCEDURE — 99024 POSTOP FOLLOW-UP VISIT: CPT | Mod: ,,, | Performed by: ORTHOPAEDIC SURGERY

## 2024-10-09 PROCEDURE — 99999 PR PBB SHADOW E&M-EST. PATIENT-LVL IV: CPT | Mod: PBBFAC,,, | Performed by: ORTHOPAEDIC SURGERY

## 2024-10-09 NOTE — PROGRESS NOTES
Patient is here follow-up of the right shoulder rotator cuff repair.  At this time she is not having any pain.  She was still has weakness on forward flexion abduction.  But this is improving.  She has good passive motion.  I will follow back up in 2 months.  We will continue with therapy.

## 2024-12-09 ENCOUNTER — OFFICE VISIT (OUTPATIENT)
Dept: ORTHOPEDICS | Facility: CLINIC | Age: 69
End: 2024-12-09
Payer: MEDICARE

## 2024-12-09 VITALS
DIASTOLIC BLOOD PRESSURE: 65 MMHG | OXYGEN SATURATION: 100 % | HEIGHT: 62 IN | SYSTOLIC BLOOD PRESSURE: 121 MMHG | BODY MASS INDEX: 22.21 KG/M2 | WEIGHT: 120.69 LBS | HEART RATE: 63 BPM

## 2024-12-09 DIAGNOSIS — M25.511 RIGHT SHOULDER PAIN, UNSPECIFIED CHRONICITY: Primary | ICD-10-CM

## 2024-12-09 PROCEDURE — 99212 OFFICE O/P EST SF 10 MIN: CPT | Mod: S$PBB,,, | Performed by: ORTHOPAEDIC SURGERY

## 2024-12-09 PROCEDURE — 99999 PR PBB SHADOW E&M-EST. PATIENT-LVL III: CPT | Mod: PBBFAC,,, | Performed by: ORTHOPAEDIC SURGERY

## 2024-12-09 PROCEDURE — 99213 OFFICE O/P EST LOW 20 MIN: CPT | Mod: PBBFAC | Performed by: ORTHOPAEDIC SURGERY

## 2024-12-09 NOTE — PROGRESS NOTES
Patient is here follow-up of the right shoulder rotator cuff repair.  She is still having weakness in her rotator cuff.  We discussed possible reverse shoulder.  She does not wish to have any further surgical intervention performed.  He is not having pain she has full passive motion just actively she is only come in proximally 80-90 degrees of forward flexion less than 80° of abduction.  She has good internal external rotation.  Let her use her arm as tolerates.  No complaints of pain.  Follow-up as needed.

## (undated) DEVICE — SOL NACL IRR 3000ML

## (undated) DEVICE — DRAPE INVISISHIELD TWL 18X24IN

## (undated) DEVICE — DRAPE INVISISHIELD U 48X52IN

## (undated) DEVICE — SYR IRRIGATION BULB STER 60ML

## (undated) DEVICE — Device

## (undated) DEVICE — ADHESIVE MASTISOL VIAL 48/BX

## (undated) DEVICE — SPONGE LAP 18X18 PREWASHED

## (undated) DEVICE — GLOVE SENSICARE PI SURG 7

## (undated) DEVICE — KIT SHLDR POMIERSKIWATSON RUSH

## (undated) DEVICE — CANNULA OBT CONICAL TIP 5.5MM

## (undated) DEVICE — SLING ARM VOGUE STRP BLU MED

## (undated) DEVICE — APPLICATOR CHLORAPREP ORN 26ML

## (undated) DEVICE — GLOVE SENSICARE PI GRN 6.5

## (undated) DEVICE — GOWN NONREINF SET-IN SLV 2XL

## (undated) DEVICE — STRIP MEDI WND CLSR 1/2X4IN

## (undated) DEVICE — GLOVE SENSICARE PI GRN 8.5

## (undated) DEVICE — GLOVE SENSICARE PI SURG 6.5

## (undated) DEVICE — SUT 2-0 VICRYL / CT-1

## (undated) DEVICE — PROBE MULTI PORT RF 90 DEGREE

## (undated) DEVICE — PENCIL ELECTROSURG HOLST W/BLD

## (undated) DEVICE — SYR 30CC LUER LOCK

## (undated) DEVICE — SHAVER TOMCAT 4.0

## (undated) DEVICE — NDL SUREFIRE SCORPION RC

## (undated) DEVICE — BLADE SURG #15 CARBON STEEL

## (undated) DEVICE — HANDLE MEDI-VAC YANKAUER STR

## (undated) DEVICE — GLOVE SENSICARE PI SURG 8.5

## (undated) DEVICE — TUBING CROSSFLOW INFLOW CASS

## (undated) DEVICE — SUT BLU BR 2 TAPERD NDL 1/2

## (undated) DEVICE — SUT ETHILON 3-0 PS2 18 BLK

## (undated) DEVICE — BANDAGE COHES LTX TAN 4INX5YD